# Patient Record
Sex: FEMALE | Race: BLACK OR AFRICAN AMERICAN | NOT HISPANIC OR LATINO | URBAN - METROPOLITAN AREA
[De-identification: names, ages, dates, MRNs, and addresses within clinical notes are randomized per-mention and may not be internally consistent; named-entity substitution may affect disease eponyms.]

---

## 2018-06-19 ENCOUNTER — HISTORICAL (OUTPATIENT)
Dept: ADMINISTRATIVE | Facility: HOSPITAL | Age: 58
End: 2018-06-19

## 2018-06-19 LAB
ALBUMIN SERPL-MCNC: 4.4 GM/DL (ref 3.4–5)
ALBUMIN/GLOB SERPL: 1.52 {RATIO} (ref 1.5–2.5)
ALP SERPL-CCNC: 110 UNIT/L (ref 38–126)
ALT SERPL-CCNC: 11 UNIT/L (ref 7–52)
AST SERPL-CCNC: 12 UNIT/L (ref 15–37)
BILIRUB SERPL-MCNC: 1.1 MG/DL (ref 0.2–1)
BILIRUBIN DIRECT+TOT PNL SERPL-MCNC: 0.3 MG/DL (ref 0–0.5)
BILIRUBIN DIRECT+TOT PNL SERPL-MCNC: 0.8 MG/DL
BUN SERPL-MCNC: 10 MG/DL (ref 7–18)
CALCIUM SERPL-MCNC: 9.3 MG/DL (ref 8.5–10)
CHLORIDE SERPL-SCNC: 105 MMOL/L (ref 98–107)
CHOLEST SERPL-MCNC: 167 MG/DL (ref 0–200)
CHOLEST/HDLC SERPL: 3 {RATIO}
CO2 SERPL-SCNC: 28 MMOL/L (ref 21–32)
CREAT SERPL-MCNC: 0.82 MG/DL (ref 0.6–1.3)
CREAT UR-MCNC: 200 MG/DL
EST. AVERAGE GLUCOSE BLD GHB EST-MCNC: 140 MG/DL
GLOBULIN SER-MCNC: 2.9 GM/DL (ref 1.2–3)
GLUCOSE SERPL-MCNC: 102 MG/DL (ref 74–106)
HBA1C MFR BLD: 6.5 % (ref 4.4–6.4)
HDLC SERPL-MCNC: 55 MG/DL (ref 35–60)
LDLC SERPL CALC-MCNC: 93 MG/DL (ref 0–129)
MICROALBUMIN UR-MCNC: 30 MG/L
MICROALBUMIN/CREAT RATIO PNL UR: <30 MG/GM
POTASSIUM SERPL-SCNC: 4 MMOL/L (ref 3.5–5.1)
PROT SERPL-MCNC: 7.3 GM/DL (ref 6.4–8.2)
SODIUM SERPL-SCNC: 142 MMOL/L (ref 136–145)
TRIGL SERPL-MCNC: 51 MG/DL (ref 30–150)
VLDLC SERPL CALC-MCNC: 10.2 MG/DL

## 2019-03-12 ENCOUNTER — HISTORICAL (OUTPATIENT)
Dept: ADMINISTRATIVE | Facility: HOSPITAL | Age: 59
End: 2019-03-12

## 2019-03-12 LAB
ABS NEUT (OLG): 3.1 X10(3)/MCL (ref 2.1–9.2)
ALBUMIN SERPL-MCNC: 4.4 GM/DL (ref 3.4–5)
ALBUMIN/GLOB SERPL: 1.38 {RATIO} (ref 1.5–2.5)
ALP SERPL-CCNC: 117 UNIT/L (ref 38–126)
ALT SERPL-CCNC: 12 UNIT/L (ref 7–52)
AST SERPL-CCNC: 15 UNIT/L (ref 15–37)
BILIRUB SERPL-MCNC: 1.5 MG/DL (ref 0.2–1)
BILIRUBIN DIRECT+TOT PNL SERPL-MCNC: 0.3 MG/DL (ref 0–0.5)
BILIRUBIN DIRECT+TOT PNL SERPL-MCNC: 1.2 MG/DL
BUN SERPL-MCNC: 13 MG/DL (ref 7–18)
CALCIUM SERPL-MCNC: 9.5 MG/DL (ref 8.5–10)
CHLORIDE SERPL-SCNC: 101 MMOL/L (ref 98–107)
CHOLEST SERPL-MCNC: 159 MG/DL (ref 0–200)
CHOLEST/HDLC SERPL: 3.7 {RATIO}
CO2 SERPL-SCNC: 28 MMOL/L (ref 21–32)
CREAT SERPL-MCNC: 0.8 MG/DL (ref 0.6–1.3)
CREAT UR-MCNC: 50 MG/DL
ERYTHROCYTE [DISTWIDTH] IN BLOOD BY AUTOMATED COUNT: 14.2 % (ref 11.5–17)
EST. AVERAGE GLUCOSE BLD GHB EST-MCNC: 148 MG/DL
GLOBULIN SER-MCNC: 3.2 GM/DL (ref 1.2–3)
GLUCOSE SERPL-MCNC: 126 MG/DL (ref 74–106)
HBA1C MFR BLD: 6.8 % (ref 4.4–6.4)
HCT VFR BLD AUTO: 39.4 % (ref 37–47)
HDLC SERPL-MCNC: 43 MG/DL (ref 35–60)
HGB BLD-MCNC: 13.3 GM/DL (ref 12–16)
LDLC SERPL CALC-MCNC: 80 MG/DL (ref 0–129)
LYMPHOCYTES # BLD AUTO: 2.3 X10(3)/MCL (ref 0.6–3.4)
LYMPHOCYTES NFR BLD AUTO: 40.8 % (ref 13–40)
MCH RBC QN AUTO: 29 PG (ref 27–31.2)
MCHC RBC AUTO-ENTMCNC: 34 GM/DL (ref 32–36)
MCV RBC AUTO: 86 FL (ref 80–94)
MICROALBUMIN UR-MCNC: 10 MG/L
MICROALBUMIN/CREAT RATIO PNL UR: <30 MG/GM
MONOCYTES # BLD AUTO: 0.3 X10(3)/MCL (ref 0.1–1.3)
MONOCYTES NFR BLD AUTO: 6.1 % (ref 0.1–24)
NEUTROPHILS NFR BLD AUTO: 53.1 % (ref 47–80)
PLATELET # BLD AUTO: 237 X10(3)/MCL (ref 130–400)
PMV BLD AUTO: 9.6 FL (ref 9.4–12.4)
POTASSIUM SERPL-SCNC: 4.1 MMOL/L (ref 3.5–5.1)
PROT SERPL-MCNC: 7.6 GM/DL (ref 6.4–8.2)
RBC # BLD AUTO: 4.58 X10(6)/MCL (ref 4.2–5.4)
SODIUM SERPL-SCNC: 138 MMOL/L (ref 136–145)
TRIGL SERPL-MCNC: 65 MG/DL (ref 30–150)
TSH SERPL-ACNC: 1.78 MIU/ML (ref 0.35–4.94)
VLDLC SERPL CALC-MCNC: 13 MG/DL
WBC # SPEC AUTO: 5.7 X10(3)/MCL (ref 4.5–11.5)

## 2020-01-08 ENCOUNTER — HISTORICAL (OUTPATIENT)
Dept: ADMINISTRATIVE | Facility: HOSPITAL | Age: 60
End: 2020-01-08

## 2020-01-08 LAB
ALBUMIN SERPL-MCNC: 4.4 GM/DL (ref 3.4–5)
ALBUMIN/GLOB SERPL: 1.47 {RATIO} (ref 1.5–2.5)
ALP SERPL-CCNC: 111 UNIT/L (ref 38–126)
ALT SERPL-CCNC: 12 UNIT/L (ref 7–52)
AST SERPL-CCNC: 13 UNIT/L (ref 15–37)
BILIRUB SERPL-MCNC: 1.3 MG/DL (ref 0.2–1)
BILIRUBIN DIRECT+TOT PNL SERPL-MCNC: 0.2 MG/DL (ref 0–0.5)
BILIRUBIN DIRECT+TOT PNL SERPL-MCNC: 1.1 MG/DL
BUN SERPL-MCNC: 8 MG/DL (ref 7–18)
CALCIUM SERPL-MCNC: 9.4 MG/DL (ref 8.5–10)
CHLORIDE SERPL-SCNC: 104 MMOL/L (ref 98–107)
CHOLEST SERPL-MCNC: 217 MG/DL (ref 0–200)
CHOLEST/HDLC SERPL: 3.9 {RATIO}
CO2 SERPL-SCNC: 27 MMOL/L (ref 21–32)
CREAT SERPL-MCNC: 0.73 MG/DL (ref 0.6–1.3)
CREAT UR-MCNC: 50 MG/DL
EST. AVERAGE GLUCOSE BLD GHB EST-MCNC: 148 MG/DL
GLOBULIN SER-MCNC: 3 GM/DL (ref 1.2–3)
GLUCOSE SERPL-MCNC: 105 MG/DL (ref 74–106)
HBA1C MFR BLD: 6.8 % (ref 4.4–6.4)
HDLC SERPL-MCNC: 56 MG/DL (ref 35–60)
LDLC SERPL CALC-MCNC: 149 MG/DL (ref 0–129)
MICROALBUMIN UR-MCNC: 10 MG/L
MICROALBUMIN/CREAT RATIO PNL UR: <30 MG/GM
POTASSIUM SERPL-SCNC: 3.8 MMOL/L (ref 3.5–5.1)
PROT SERPL-MCNC: 7.4 GM/DL (ref 6.4–8.2)
SODIUM SERPL-SCNC: 140 MMOL/L (ref 136–145)
TRIGL SERPL-MCNC: 61 MG/DL (ref 30–150)
VLDLC SERPL CALC-MCNC: 12.2 MG/DL

## 2020-10-14 ENCOUNTER — HISTORICAL (OUTPATIENT)
Dept: ADMINISTRATIVE | Facility: HOSPITAL | Age: 60
End: 2020-10-14

## 2020-10-14 LAB
ABS NEUT (OLG): 5.6 X10(3)/MCL (ref 2.1–9.2)
ALBUMIN SERPL-MCNC: 4.5 GM/DL (ref 3.4–5)
ALBUMIN/GLOB SERPL: 1.61 {RATIO} (ref 1.5–2.5)
ALP SERPL-CCNC: 117 UNIT/L (ref 38–126)
ALT SERPL-CCNC: 9 UNIT/L (ref 7–52)
APPEARANCE, UA: CLEAR
AST SERPL-CCNC: 10 UNIT/L (ref 15–37)
BACTERIA #/AREA URNS AUTO: NORMAL /HPF
BILIRUB SERPL-MCNC: 1 MG/DL (ref 0.2–1)
BILIRUB UR QL STRIP: NEGATIVE MG/DL
BILIRUBIN DIRECT+TOT PNL SERPL-MCNC: 0.2 MG/DL (ref 0–0.5)
BILIRUBIN DIRECT+TOT PNL SERPL-MCNC: 0.8 MG/DL
BUN SERPL-MCNC: 10 MG/DL (ref 7–18)
CALCIUM SERPL-MCNC: 9.6 MG/DL (ref 8.5–10)
CHLORIDE SERPL-SCNC: 100 MMOL/L (ref 98–107)
CHOLEST SERPL-MCNC: 178 MG/DL (ref 0–200)
CHOLEST/HDLC SERPL: 3 {RATIO}
CO2 SERPL-SCNC: 27 MMOL/L (ref 21–32)
COLOR UR: YELLOW
CREAT SERPL-MCNC: 0.78 MG/DL (ref 0.6–1.3)
CREAT UR-MCNC: 50 MG/DL
ERYTHROCYTE [DISTWIDTH] IN BLOOD BY AUTOMATED COUNT: 15.4 % (ref 11.5–17)
EST. AVERAGE GLUCOSE BLD GHB EST-MCNC: 171 MG/DL
GLOBULIN SER-MCNC: 2.8 GM/DL (ref 1.2–3)
GLUCOSE (UA): NEGATIVE MG/DL
GLUCOSE SERPL-MCNC: 119 MG/DL (ref 74–106)
HBA1C MFR BLD: 7.6 % (ref 4.4–6.4)
HCT VFR BLD AUTO: 39.9 % (ref 37–47)
HDLC SERPL-MCNC: 59 MG/DL (ref 35–60)
HGB BLD-MCNC: 13.2 GM/DL (ref 12–16)
HGB UR QL STRIP: NEGATIVE UNIT/L
KETONES UR QL STRIP: NEGATIVE MG/DL
LDLC SERPL CALC-MCNC: 104 MG/DL (ref 0–129)
LEUKOCYTE ESTERASE UR QL STRIP: NEGATIVE UNIT/L
LYMPHOCYTES # BLD AUTO: 3.1 X10(3)/MCL (ref 0.6–3.4)
LYMPHOCYTES NFR BLD AUTO: 33.9 % (ref 13–40)
MCH RBC QN AUTO: 28.4 PG (ref 27–31.2)
MCHC RBC AUTO-ENTMCNC: 33 GM/DL (ref 32–36)
MCV RBC AUTO: 86 FL (ref 80–94)
MICROALBUMIN UR-MCNC: 10 MG/L
MICROALBUMIN/CREAT RATIO PNL UR: <30 MG/GM
MONOCYTES # BLD AUTO: 0.4 X10(3)/MCL (ref 0.1–1.3)
MONOCYTES NFR BLD AUTO: 4.7 % (ref 0.1–24)
NEUTROPHILS NFR BLD AUTO: 61.4 % (ref 47–80)
NITRITE UR QL STRIP.AUTO: NEGATIVE
PH UR STRIP: 6 [PH]
PLATELET # BLD AUTO: 291 X10(3)/MCL (ref 130–400)
PMV BLD AUTO: 9.3 FL (ref 9.4–12.4)
POTASSIUM SERPL-SCNC: 3.7 MMOL/L (ref 3.5–5.1)
PROT SERPL-MCNC: 7.3 GM/DL (ref 6.4–8.2)
PROT UR QL STRIP: NEGATIVE MG/DL
RBC # BLD AUTO: 4.64 X10(6)/MCL (ref 4.2–5.4)
RBC #/AREA URNS HPF: NORMAL /HPF
SODIUM SERPL-SCNC: 140 MMOL/L (ref 136–145)
SP GR UR STRIP: 1.02
SQUAMOUS EPITHELIAL, UA: NORMAL /LPF
TRIGL SERPL-MCNC: 76 MG/DL (ref 30–150)
UROBILINOGEN UR STRIP-ACNC: 0.2 MG/DL
VLDLC SERPL CALC-MCNC: 15.2 MG/DL
WBC # SPEC AUTO: 9.1 X10(3)/MCL (ref 4.5–11.5)
WBC #/AREA URNS AUTO: NORMAL /[HPF]

## 2021-01-12 ENCOUNTER — HISTORICAL (OUTPATIENT)
Dept: ADMINISTRATIVE | Facility: HOSPITAL | Age: 61
End: 2021-01-12

## 2021-01-12 LAB
APPEARANCE, UA: NORMAL
BACTERIA #/AREA URNS AUTO: NORMAL /HPF
BILIRUB UR QL STRIP: NEGATIVE MG/DL
COLOR UR: YELLOW
GLUCOSE (UA): NEGATIVE MG/DL
HGB UR QL STRIP: NEGATIVE UNIT/L
KETONES UR QL STRIP: NEGATIVE MG/DL
LEUKOCYTE ESTERASE UR QL STRIP: NEGATIVE UNIT/L
NITRITE UR QL STRIP.AUTO: NEGATIVE
PH UR STRIP: 6 [PH]
PROT UR QL STRIP: NEGATIVE MG/DL
RBC #/AREA URNS HPF: NORMAL /HPF
SP GR UR STRIP: 1.01
SQUAMOUS EPITHELIAL, UA: NORMAL /LPF
UROBILINOGEN UR STRIP-ACNC: 0.2 MG/DL
WBC #/AREA URNS AUTO: NORMAL /[HPF]

## 2022-04-10 ENCOUNTER — HISTORICAL (OUTPATIENT)
Dept: ADMINISTRATIVE | Facility: HOSPITAL | Age: 62
End: 2022-04-10

## 2022-04-28 VITALS
BODY MASS INDEX: 40.08 KG/M2 | OXYGEN SATURATION: 100 % | WEIGHT: 217.81 LBS | DIASTOLIC BLOOD PRESSURE: 83 MMHG | HEIGHT: 62 IN | SYSTOLIC BLOOD PRESSURE: 153 MMHG

## 2022-05-03 NOTE — HISTORICAL [facility] CERNER
This is a historical note converted from Flower. Formatting and pictures may have been removed.  Please reference Flower for original formatting and attached multimedia. Chief Complaint  FMLA FORM/ BACK PAIN  History of Present Illness  Patient c/o right sided LBP that started on Friday, 1-8-21.? No known trauma. No known hx of Lumbar DDD.? Denies urinary complaints.  Works for [employer], food stamp determination.  Review of Systems  See HPI. ?Denies cardiac or respiratory complaints.? Denies?GI or  complaints.? Denies fever.  Physical Exam  Vitals & Measurements  HR:?111(Peripheral)? BP:?160/90?  HT:?157?cm? WT:?96.7?kg? BMI:?39.23?  General :?????Well-developed and? nourished, moderately anxious,?alert and oriented ?4. ?Stable on room air.  Neck :?????Supple, no lymphadenopathy, no thyromegaly, no bruits, no jugular venous distention  Cardiovascular :?????? Regular rhythm and rate, no murmurs, radial and dorsal pedal pulses 2+ bilaterally  Respiratory :??????Lungs clear to auscultation bilaterally, no wheezes, no crackles, no rhonchi.? Good air movement  Abdomen :?????? ?NABS, soft, nontender, no hepatosplenomegaly, no masses, no guarding or rebound????????????????????????  MS :???????? joints WNL, FROM, negative SLR, no?CCE  Neuro :? ?Grossly intact  Heme :?????? No bruising or petechiae?  Back:??????? no CVAT, lumbar muscle spasm with tenderness that is exacerbated range of motion.? No erythema or?vesicles.  Assessment/Plan  1.?Low back pain?M54.5  ?Will Rx Flexeril that she can use HS. Advised to start walking for strengthening.? Given Rx for PT.  2.?Physical deconditioning?R53.81  ?We will reevaluate her next week,?filled out?FMLA form  3.?Fatigue?R53.83  ?Will re-evaluate next Tuesday.  4.?COVID-19?U07.1  ?Quarantine completed on 1-6-21  5.?Anxiety?F41.9  Prescribe sertraline 25 mg  Referrals  Clinic Follow up, *Est. 01/19/21 15:30:00 CST, Order for future visit, Low back pain, [facility]   Problem  List/Past Medical History  Ongoing  Acute asthma  Bronchitis, acute  Diabetes  Hypercholesteremia  Hypertension  No-show for appointment  Obesity  Wellness examination  Historical  No qualifying data  Procedure/Surgical History  Drainage of Neck Skin, External Approach (02/01/2020)  Incision and drainage of abscess (eg, carbuncle, suppurative hidradenitis, cutaneous or subcutaneous abscess, cyst, furuncle, or paronychia); simple or single (02/01/2020)  Boil Removed from eyelid of Left Eye (2013)   Medications  amlodipine 5 mg oral tablet, See Instructions, 1 refills  atorvastatin 20 mg oral tablet, See Instructions, 1 refills  atorvastatin 40 mg oral tablet, 40 mg= 1 tab(s), Oral, Daily  Diprolene AF 0.05% topical cream, 1 lois, TOP, Daily, 1 refills  Flexeril 5 mg oral tablet, See Instructions  hydrochlorothiazide 25 mg oral tablet, See Instructions, 1 refills  losartan 50 mg oral tablet, See Instructions, 1 refills  Medrol 4 mg oral tablet, 1 packet(s), Oral, As Directed  meloxicam 15 mg oral tablet, 15 mg= 1 tab(s), Oral, Daily  MetFORMIN (Eqv-Glucophage XR) 500 mg oral tablet, extended release, See Instructions, 1 refills  Physical Therapy, See Instructions  sertraline 25 mg oral tablet, 25 mg= 1 tab(s), Oral, Daily, 3 refills  Allergies  E-Mycin?(Stomach discomfort)  erythromycin  Social History  Abuse/Neglect  No, 01/12/2021  No, 10/14/2020  No, No, Yes, 02/01/2020  Tobacco  Never (less than 100 in lifetime), No, 01/12/2021  Never (less than 100 in lifetime), No, 10/14/2020  Never (less than 100 in lifetime), N/A, Household tobacco concerns: No. Smokeless Tobacco Use: Never., 02/01/2020  Never (less than 100 in lifetime), No, 03/12/2019  Never smoker, 01/05/2016  Family History  Diabetes mellitus type 2: Mother and Father.  Hypertension.: Mother and Father.  Peripheral vascular disease.: Mother.  Stroke: Father.  Health Maintenance  Health Maintenance  ???Pending?(in the next year)  ??? ??OverDue  ??? ? ?  ?Diabetes Maintenance-Foot Exam due??03/21/20??and every 1??year(s)  ??? ? ? ?Influenza Vaccine due??10/01/20??and every 1??day(s)  ??? ??Due?  ??? ? ? ?Alcohol Misuse Screening due??01/02/21??and every 1??year(s)  ??? ? ? ?ADL Screening due??01/17/21??and every 1??year(s)  ??? ? ? ?Aspirin Therapy for CVD Prevention due??01/17/21??and every 1??year(s)  ??? ? ? ?Asthma Management-Asthma Education due??01/17/21??and every 6??month(s)  ??? ? ? ?Asthma Management-Spirometry due??01/17/21??Variable frequency  ??? ? ? ?Asthma Management-Alejandre Peak Flow due??01/17/21??Variable frequency  ??? ? ? ?Asthma Management-Written Action Plan due??01/17/21??and every 6??month(s)  ??? ? ? ?Breast Cancer Screening due??01/17/21??Unknown Frequency  ??? ? ? ?Cervical Cancer Screening due??01/17/21??Unknown Frequency  ??? ? ? ?Colorectal Screening due??01/17/21??Unknown Frequency  ??? ? ? ?Depression Screening due??01/17/21??Unknown Frequency  ??? ? ? ?Diabetes Maintenance-Eye Exam due??01/17/21??Unknown Frequency  ??? ? ? ?Hypertension Management-Education due??01/17/21??and every 1??year(s)  ??? ? ? ?Tetanus Vaccine due??01/17/21??and every 10??year(s)  ??? ? ? ?Zoster Vaccine due??01/17/21??Unknown Frequency  ??? ??Due In Future?  ??? ? ? ?Diabetes Maintenance-HgbA1c not due until??10/14/21??and every 1??year(s)  ??? ? ? ?Hypertension Management-BMP not due until??10/14/21??and every 1??year(s)  ??? ? ? ?Obesity Screening not due until??01/01/22??and every 1??year(s)  ??? ? ? ?Blood Pressure Screening not due until??01/12/22??and every 1??year(s)  ??? ? ? ?Body Mass Index Check not due until??01/12/22??and every 1??year(s)  ??? ? ? ?Hypertension Management-Blood Pressure not due until??01/12/22??and every 1??year(s)  ???Satisfied?(in the past 1 year)  ??? ??Satisfied?  ??? ? ? ?Blood Pressure Screening on??01/12/21.??Satisfied by Elisabet Mark CMA  ??? ? ? ?Body Mass Index Check on??01/12/21.??Satisfied by Deedee BOURNE  Elisabet S.  ??? ? ? ?Diabetes Maintenance-Fasting Lipid Profile on??10/14/20.??Satisfied by Gosia García  ??? ? ? ?Diabetes Maintenance-HgbA1c on??10/14/20.??Satisfied by Bony Pablo  ??? ? ? ?Diabetes Maintenance-Microalbumin on??10/14/20.??Satisfied by Bony Pablo  ??? ? ? ?Diabetes Screening on??10/14/20.??Satisfied by Gosia García  ??? ? ? ?Hypertension Management-Blood Pressure on??01/12/21.??Satisfied by Elisabet Mark CMA S.  ??? ? ? ?Hypertension Management-BMP on??10/14/20.??Satisfied by Gosia García  ??? ? ? ?Influenza Vaccine on??01/12/21.??Satisfied by Elisabet Mark CMA S.  ??? ? ? ?Lipid Screening on??10/14/20.??Satisfied by Gosia García  ??? ? ? ?Obesity Screening on??01/12/21.??Satisfied by Elisabet Mark CMA S.  ?  Lab Results  Test Name Test Result Date/Time   UA Nitri Negative 01/12/2021 15:27 CST   UA Leuk Est Negative UA 01/12/2021 15:27 CST   UA Squam Epithelial Rare 01/12/2021 15:27 CST

## 2022-05-03 NOTE — HISTORICAL [facility] CERN[facility]
This is a historical note converted from Flower. Formatting and pictures may have been removed.  Please reference Flower for original formatting and attached multimedia. Chief Complaint  ROUTINE FV, NEEDS BP REFILL  History of Present Illness  Patient here for six-month follow-up.? Denies any side effects to current medications.? Tolerating current meds and?feels that they are effective.? Denies change in social or family history.? No new complaints.  Working at [employer] office. No exercise. No nicotine.  Review of Systems  General:??????????????? Patient reports energy level is good. Denies weight change. ?Denies fever,chills, night sweats, fatigue or weakness.  HEENT:?????????????????? Denies vision changes or eye pain.? No sore throat, ear pain, sinus pressure or discharge.  Cardiovascular:??? Denies chest pain, palpitations, dyspnea on exertion, orthopnea.  Respiratory:???????? No cough, wheezing, shortness of breath, or sputum.  GI:????????????????????????? Denies nausea, emesis, constipation, diarrhea, melena, hematochezia or abdominal pain  :??????????????????????? No frequency, urgency, hematuria, discharge, or incontinence  MS:?????????????????????? Denies myalgias, arthralgias, joint effusion, edema, or weakness  Neuro:????????????????? No headaches, numbness in extremities, tingling, dizziness, or weakness  Psych:?????????????????? Denies anxiety, depression, suicidal or homicidal ideations, or irritability  Endo:??????????????????? Denies polyuria, polydipsia, polyphagia  Heme:????????????????? No abnormal bleeding or bruising. No lymph node enlargement or pain.  Physical Exam  Vitals & Measurements  HR:?72(Peripheral)? BP:?132/88?  HT:?165?cm? HT:?165?cm? WT:?97.9?kg? WT:?97.9?kg?  General :?????Well-developed and? nourished, no apparent distress, alert and oriented ?4  Integument :????? Skin is intact with no erythema.? No pustules or vesicles.? No rash or scale. No Lymphadenopathy.? No urticaria.?  No abnormal nevi  Neck :?????Supple, no lymphadenopathy, no thyromegaly, no bruits, no jugular venous distention  Cardiovascular :?????? Regular rhythm and rate, no murmurs, radial and dorsal pedal pulses 2+ bilaterally  Respiratory :??????Lungs clear to auscultation bilaterally, no wheezes, no crackles, no rhonchi.? Good air movement  Abdomen :?????? ?NABS, soft, nontender, no hepatosplenomegaly, no masses, no guarding or rebound????????????????????????  MS :??????? No muscle tenderness, joints WNL, FROM, negative SLR, no?CCE  Neuro :? ?????? CN II-XII intact, reflexes 2+ throughout, no motor sensory deficits, negative?cerebellar? tests  Heme :?????? No bruising or petechiae?  Back:??????? no CVAT  Assessment/Plan  1.?Diabetes  ?Diet discussed in detail. ?Refilled metformin?for 6 months. ?We will check A1c and microalbumin today. ?Follow-up to be determined. ?Otherwise we will see her in 6 months for CPX  Ordered:  Clinic Follow up, *Est. 12/19/18 3:00:00 CST, CPX in 6 months, Order for future visit, Diabetes  Hypertension  Hypercholesteremia, [facility]  Hemoglobin A1c, Routine collect, 06/19/18 14:41:00 CDT, Blood, Order for future visit, Stop date 06/19/18 14:41:00 CDT, Lab Collect, Diabetes, 06/19/18 14:41:00 CDT  Lab Collection Request, 06/19/18 14:41:00 CDT, [facility] - AFP, 06/19/18 14:41:00 CDT  Microalbumin Urine, Routine collect, Urine, Order for future visit, 06/19/18 14:41:00 CDT, Stop date 06/19/18 14:41:00 CDT, Nurse collect, Diabetes  Office/Outpatient Visit Level 4 Established [id number], Diabetes  Hypertension  Hypercholesteremia, [facility] - [facility], 06/19/18 14:41:00 CDT  ?  2.?Hypertension  ?Stable. ?Refill medication for 6 months  Ordered:  Clinic Follow up, *Est. 12/19/18 3:00:00 CST, CPX in 6 months, Order for future visit, Diabetes  Hypertension  Hypercholesteremia, [facility]  Lab Collection Request, 06/19/18 14:41:00 CDT, [facility], 06/19/18 14:41:00 CDT  Office/Outpatient Visit Level  4 Established [id number] PC, Diabetes  Hypertension  Hypercholesteremia, [facility] - [facility], 06/19/18 14:41:00 CDT  ?  3.?Hypercholesteremia  ?Refill medication for 6 months. ?Will check CMP and lipids today.  Ordered:  Clinic Follow up, *Est. 12/19/18 3:00:00 CST, CPX in 6 months, Order for future visit, Diabetes  Hypertension  Hypercholesteremia, [facility]  Comprehensive Metabolic Panel, Routine collect, 06/19/18 14:41:00 CDT, Blood, Order for future visit, Stop date 06/19/18 14:41:00 CDT, Lab Collect, Hypercholesteremia, 06/19/18 14:41:00 CDT  Lab Collection Request, 06/19/18 14:41:00 CDT, [facility] - [facility], 06/19/18 14:41:00 CDT  Lipid Panel, Routine collect, 06/19/18 14:41:00 CDT, Blood, Order for future visit, Stop date 06/19/18 14:41:00 CDT, Lab Collect, Hypercholesteremia, 06/19/18 14:41:00 CDT  Office/Outpatient Visit Level 4 Established [id number] PC, Diabetes  Hypertension  Hypercholesteremia, [facility] - [facility], 06/19/18 14:41:00 CDT  ?  Orders:  amLODIPine, See Instructions, TAKE 1 TABLET BY MOUTH EVERY DAY, # 30 tab(s), 5 Refill(s), Pharmacy: [facility] [id number]  atorvastatin, See Instructions, TAKE 1 TABLET BY MOUTH EVERY DAY, # 30 tab(s), 5 Refill(s), Pharmacy: [id number]  hydrochlorothiazide, See Instructions, TAKE 1 TABLET BY MOUTH EVERY DAY, # 30 tab(s), 5 Refill(s), Pharmacy: [facility] [id number]  losartan, See Instructions, TAKE 1 TABLET BY MOUTH EVERY DAY, # 30 tab(s), 5 Refill(s), [id number]  metFORMIN, See Instructions, TAKE 1 TABLET BY MOUTH EVERY DAY, # 30 tab(s), 5 Refill(s), Pharmacy: [facility] [id number]   Problem List/Past Medical History  Ongoing  Acute asthma  Bronchitis, acute  Diabetes  Hypercholesteremia  Hypertension  Historical  No qualifying data  Procedure/Surgical History  Boil Removed from eyelid of Left Eye (2013).  Medications  amLODIPine 5 mg oral tablet, See Instructions, 5 refills  atorvastatin 20 mg oral tablet, See  Instructions, 5 refills  hydrochlorothiazide 25 mg oral tablet, See Instructions, 5 refills  losartan 50 mg oral tablet, See Instructions, 5 refills  metFORMIN 500 mg oral tablet, extended release, See Instructions, 5 refills  Allergies  E-Mycin?(Stomach discomfort)  erythromycin  Social History  Tobacco  Never smoker, 03/26/2018  Family History  Family history is negative  Health Maintenance  Health Maintenance  ???Pending?(in the next year)  ??? ??OverDue  ??? ? ? ?Body Mass Index Check due??01/05/17??and every 1??year(s)  ??? ? ? ?Obesity Screening due??01/05/17??and every 1??year(s)  ??? ??Due?  ??? ? ? ?Alcohol Misuse Screening due??06/19/18??and every 1??year(s)  ??? ? ? ?Aspirin Therapy for CVD Prevention due??06/19/18??and every 1??year(s)  ??? ? ? ?Asthma Management-Asthma Medication Prescribed due??06/19/18??Variable frequency  ??? ? ? ?Asthma Management-Asthma Education due??06/19/18??and every 6??month(s)  ??? ? ? ?Asthma Management-Spirometry due??06/19/18??Variable frequency  ??? ? ? ?Asthma Management-Alejandre Peak Flow due??06/19/18??Variable frequency  ??? ? ? ?Asthma Management-Written Action Plan due??06/19/18??and every 6??month(s)  ??? ? ? ?Breast Cancer Screening due??06/19/18??Variable frequency  ??? ? ? ?Cervical Cancer Screening due??06/19/18??and every 5??year(s)  ??? ? ? ?Colorectal Screening due??06/19/18??Variable frequency  ??? ? ? ?Depression Screening due??06/19/18??and every 1??year(s)  ??? ? ? ?Hypertension Management-Education due??06/19/18??and every 1??year(s)  ??? ? ? ?Hypertension Management-BMP due??06/19/18??Variable frequency  ??? ? ? ?Smoking Cessation due??06/19/18??and every 180??day(s)  ??? ? ? ?Tetanus Vaccine due??06/19/18??and every 10??year(s)  ??? ? ? ?Zoster Vaccine due??06/19/18??and every 100??year(s)  ??? ??Due In Future?  ??? ? ? ?Influenza Vaccine not due until??10/02/18??and every 1??year(s)  ??? ? ? ?Hypertension Management-Blood Pressure not due  until??12/19/18??and every 6??month(s)  ???Satisfied?(in the past 1 year)  ??? ??Satisfied?  ??? ? ? ?Blood Pressure Screening on??06/19/18.??Satisfied by Nicolasa Bolton  ??? ? ? ?Diabetes Screening on??06/19/18.??Satisfied by AMRIT Milan Jr., MD  ??? ? ? ?Hypertension Management-Blood Pressure on??06/19/18.??Satisfied by Nicolasa Bolton  ??? ? ? ?Lipid Screening on??05/18/18.??Satisfied by AMRIT Milan Jr., MD  ??? ? ? ?Tobacco Use Screening on??06/19/18.??Satisfied by Nicolasa Bolton  ?  ?

## 2022-05-03 NOTE — HISTORICAL [facility]
This is a historical note converted from Flower. Formatting and pictures may have been removed.  Please reference Flower for original formatting and attached multimedia. Chief Complaint  CPX  History of Present Illness  Patient presents for annual wellness exam.? Denies any change in social history or family history.? Reports to be tolerating prescribed medicines well. Denies any side effects. ?No new problems.? 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 No exercise, no nicotine or etoh.  Father 83 : DM, HTN, CVA  Mother 82 :? DM, HTN , PAD  No sibs  No lifetime colonoscopy or MMG,?declines both. ?Understands risks of not diagnosing cancer.  Gets yearly eye exams.  Review of Systems  General:??????????????? Patient reports energy level is good. Denies weight change. ?Denies fever,chills, night sweats, fatigue or weakness.  Integument:???????? Rash left lower leg  HEENT:?????????????????? Denies vision changes or eye pain.? No sore throat, ear pain, sinus pressure or discharge.  Cardiovascular:??? Denies chest pain, palpitations, dyspnea on exertion, orthopnea.  Respiratory:???????? No cough, wheezing, shortness of breath, or sputum.  GI:????????????????????????? Denies nausea, emesis, constipation, diarrhea, melena, hematochezia or abdominal pain  :??????????????????????? No frequency, urgency, hematuria, discharge, or incontinence  MS:??????????????????????Low back pain. Denies edema, or weakness  Neuro:????????????????? No headaches, numbness in extremities, tingling, dizziness, or weakness  Psych:?????????????????? Denies anxiety, depression, suicidal or homicidal ideations, or irritability  Endo:??????????????????? Denies polyuria, polydipsia, polyphagia  Heme:????????????????? No abnormal bleeding or bruising. No lymph node enlargement or pain.  Physical Exam  Vitals & Measurements  HR:?76(Peripheral)? BP:?130/92?  HT:?165?cm? WT:?102.1?kg? BMI:?37.51?  General :?????Well-developed and? nourished, no apparent  distress, alert and oriented ?4  Integument :????? Eczematous changes?on extremities. ?No pustules or vesicles.? No Lymphadenopathy.? No urticaria.? No abnormal nevi  HEENT :?????ADRIANNE, EOMI ; TMs and EACs clear, normal turbinates with no erythema, normal mucosa, no sinus tenderness; no erythema or exudate of mouth or pharynx  Neck :?????Supple, no lymphadenopathy, no thyromegaly, no bruits, no jugular venous distention  Cardiovascular :?????? Regular rhythm and rate, no murmurs, radial and dorsal pedal pulses 2+ bilaterally  Respiratory :??????Lungs clear to auscultation bilaterally, no wheezes, no crackles, no rhonchi.? Good air movement  Abdomen :?????? ?NABS, soft, nontender, no hepatosplenomegaly, no masses, no guarding or rebound??????????????????????????  MS :??????? No muscle tenderness, joints WNL, FROM, negative SLR, no?CCE  Neuro :? ?????? CN II-XII intact, reflexes 2+ throughout, no motor sensory deficits, negative?cerebellar? tests  Psych :??????Normal affect, patient calm, good historian, patient answers questions??? appropriately.????Good hygiene? ??  Heme :?????? No bruising or petechiae?  ?   Diabetic foot exam:?No areas of erythema?or ulcer.? No callus.? Sensation intact bilateral feet to monofilament testing.? 2+ DP pulses bilaterally, good capillary refill.  Assessment/Plan  1.?Wellness examination?Z00.00  ?Age-appropriate wellness topics discussed.? Strongly encouraged to lose weight and to increase her aerobic exercise.? We will check CBC, CMP, A1c, lipids, microalbumin, and TSH. ?Follow-up?to be determined.? Refilled chronic medications for 6 months  2.?Diabetes?E11.9  ?Encouraged low carbohydrate diet.  3.?Hypertension?I10  ?Borderline diastolic blood pressure. ?Patient will?monitor blood pressure at home and call us if consistently greater than 140/90.  4.?Hypercholesteremia?E78.00  5.?Eczema?L30.9  Prescribed Diprolene?0.05% cream?to use as needed   Problem List/Past Medical  History  Ongoing  Acute asthma  Bronchitis, acute  Diabetes  Hypercholesteremia  Hypertension  Historical  No qualifying data  Procedure/Surgical History  Boil Removed from eyelid of Left Eye (2013)   Medications  amlodipine 5 mg oral tablet, See Instructions, 1 refills  atorvastatin 20 mg oral tablet, See Instructions, 1 refills  Diprolene AF 0.05% topical cream, 1 lois, TOP, Daily, 1 refills  hydrochlorothiazide 25 mg oral tablet, See Instructions, 1 refills  losartan 50 mg oral tablet, See Instructions, 1 refills  metFORMIN 500 mg oral tablet, extended release, See Instructions, 1 refills  Allergies  E-Mycin?(Stomach discomfort)  erythromycin  Social History  Tobacco  Never (less than 100 in lifetime), No, 03/12/2019  Never smoker, 03/26/2018  Family History  Diabetes mellitus type 2: Mother and Father.  Hypertension.: Mother and Father.  Peripheral vascular disease.: Mother.  Stroke: Father.  Health Maintenance  Health Maintenance  ???Pending?(in the next year)  ??? ??Due?  ??? ? ? ?ADL Screening due??03/21/19??and every 1??year(s)  ??? ? ? ?Alcohol Misuse Screening due??03/21/19??and every 1??year(s)  ??? ? ? ?Aspirin Therapy for CVD Prevention due??03/21/19??and every 1??year(s)  ??? ? ? ?Asthma Management-Alejandre Peak Flow due??03/21/19??Variable frequency  ??? ? ? ?Asthma Management-Written Action Plan due??03/21/19??and every 6??month(s)  ??? ? ? ?Asthma Management-Spirometry due??03/21/19??Variable frequency  ??? ? ? ?Asthma Management-Asthma Education due??03/21/19??and every 6??month(s)  ??? ? ? ?Breast Cancer Screening due??03/21/19??and every?  ??? ? ? ?Cervical Cancer Screening due??03/21/19??and every?  ??? ? ? ?Colorectal Screening due??03/21/19??and every?  ??? ? ? ?Depression Screening due??03/21/19??and every?  ??? ? ? ?Diabetes Maintenance-Eye Exam due??03/21/19??and every?  ??? ? ? ?Diabetes Maintenance-Foot Exam due??03/21/19??and every?  ??? ? ? ?Hypertension Management-Education  due??03/21/19??and every 1??year(s)  ??? ? ? ?Smoking Cessation due??03/21/19??and every 180??day(s)  ??? ? ? ?Tetanus Vaccine due??03/21/19??and every 10??year(s)  ??? ? ? ?Zoster Vaccine due??03/21/19??and every 100??year(s)  ??? ??Due In Future?  ??? ? ? ?Blood Pressure Screening not due until??03/11/20??and every 1??year(s)  ??? ? ? ?Body Mass Index Check not due until??03/11/20??and every 1??year(s)  ??? ? ? ?Diabetes Maintenance-HgbA1c not due until??03/11/20??and every 1??year(s)  ??? ? ? ?Diabetes Maintenance-Fasting Lipid Profile not due until??03/11/20??and every 1??year(s)  ??? ? ? ?Hypertension Management-Blood Pressure not due until??03/11/20??and every 1??year(s)  ??? ? ? ?Hypertension Management-BMP not due until??03/11/20??and every 1??year(s)  ??? ? ? ?Obesity Screening not due until??03/12/20??and every 1??year(s)  ???Satisfied?(in the past 1 year)  ??? ??Satisfied?  ??? ? ? ?Blood Pressure Screening on??03/12/19.??Satisfied by Hanny Mack CMA  ??? ? ? ?Body Mass Index Check on??03/12/19.??Satisfied by Hanny Mack CMA  ??? ? ? ?Diabetes Maintenance-HgbA1c on??03/12/19.??Satisfied by Toña Luis  ??? ? ? ?Diabetes Screening on??03/12/19.??Satisfied by Toña Luis  ??? ? ? ?Hypertension Management-BMP on??03/12/19.??Satisfied by Gosia García  ??? ? ? ?Influenza Vaccine on??03/12/19.??Satisfied by Hanny Mack CMA  ??? ? ? ?Lipid Screening on??03/12/19.??Satisfied by Gosia García  ??? ? ? ?Obesity Screening on??03/12/19.??Satisfied by Hanny Mack CMA  ?  ?

## 2022-06-27 PROBLEM — I10 HYPERTENSION: Status: ACTIVE | Noted: 2022-06-27

## 2022-06-27 PROBLEM — J45.909 ASTHMA: Status: ACTIVE | Noted: 2022-06-27

## 2022-06-27 PROBLEM — E11.9 DIABETES MELLITUS: Status: ACTIVE | Noted: 2022-06-27

## 2022-06-27 PROBLEM — E66.9 OBESITY: Status: ACTIVE | Noted: 2022-06-27

## 2022-06-27 PROBLEM — E78.00 HYPERCHOLESTEROLEMIA: Status: ACTIVE | Noted: 2022-06-27

## 2022-07-17 PROBLEM — F33.9 DEPRESSION, RECURRENT: Status: ACTIVE | Noted: 2022-07-17

## 2022-10-25 PROBLEM — E55.9 VITAMIN D DEFICIENCY: Status: ACTIVE | Noted: 2022-10-25

## 2022-10-25 PROBLEM — K21.9 GERD (GASTROESOPHAGEAL REFLUX DISEASE): Status: ACTIVE | Noted: 2022-10-25

## 2023-06-28 PROBLEM — F41.9 ANXIETY: Status: ACTIVE | Noted: 2023-06-28

## 2024-06-26 ENCOUNTER — TELEPHONE (OUTPATIENT)
Dept: PHARMACY | Facility: CLINIC | Age: 64
End: 2024-06-26

## 2024-06-27 NOTE — TELEPHONE ENCOUNTER
Contacted the patient to perform Medication Therapy Management review, specifically in reference to refilling metformin er to improve PDC for HEDIS measurements.   Waiting for patient response to verify

## 2024-10-15 ENCOUNTER — TELEPHONE (OUTPATIENT)
Dept: PHARMACY | Facility: CLINIC | Age: 64
End: 2024-10-15

## 2024-10-15 NOTE — TELEPHONE ENCOUNTER
[facility]/Population Health Chart Review & Patient Outreach Details For Medication Adherence Project    Reason for Outreach Encounter: 3rd Party payor non-compliance report ([employer], [employer], C, etc)  2.  Patient Outreach Method: Reviewed Patient Chart  3.   Medication in question: atorvastatin    LAST FILLED: 8/9/24 for 90 day supply  Hyperlipidemia Medications               atorvastatin (LIPITOR) 40 MG tablet Take 1 tablet (40 mg total) by mouth once daily.               4.  Reviewed and or Updates Made To: Patient Chart  5. Outreach Outcomes and/or actions taken: Patient filled medication and is on track to be adherent

## 2024-10-24 ENCOUNTER — TELEPHONE (OUTPATIENT)
Dept: PHARMACY | Facility: CLINIC | Age: 64
End: 2024-10-24

## 2024-10-24 NOTE — TELEPHONE ENCOUNTER
[facility]/Population Health Chart Review & Patient Outreach Details For Medication Adherence Project    Reason for Outreach Encounter: 3rd Party payor non-compliance report ([employer], [employer], C, etc)  2.  Patient Outreach Method: Reviewed Patient Chart  3.   Medication in question: metformin   LAST FILLED: 8/9/24 for 90 day supply  Diabetes Medications               metFORMIN (GLUCOPHAGE-XR) 500 MG ER 24hr tablet Take 1 tablet (500 mg total) by mouth 2 (two) times daily.              4.  Reviewed and or Updates Made To: Patient Chart  5. Outreach Outcomes and/or actions taken: Patient filled medication and is on track to be adherent

## 2024-11-09 ENCOUNTER — TELEPHONE (OUTPATIENT)
Dept: PHARMACY | Facility: CLINIC | Age: 64
End: 2024-11-09

## 2024-11-09 NOTE — TELEPHONE ENCOUNTER
[facility]/Population Health Chart Review & Patient Outreach Details For Medication Adherence Project    Reason for Outreach Encounter: 3rd Party payor non-compliance report ([employer], [employer], C, etc)  2.  Patient Outreach Method: Reviewed patient chart   3.   Medication in question:   Hyperlipidemia Medications               atorvastatin (LIPITOR) 40 MG tablet Take 1 tablet (40 mg total) by mouth once daily.                  atorvastatin  last filled  11/8/24 for 90 day supply      4.  Reviewed and or Updates Made To: Patient Chart  5. Outreach Outcomes and/or actions taken: Patient filled medication and is on track to be adherent  Additional Notes:

## 2024-11-26 ENCOUNTER — TELEPHONE (OUTPATIENT)
Dept: PHARMACY | Facility: CLINIC | Age: 64
End: 2024-11-26

## 2024-11-26 NOTE — TELEPHONE ENCOUNTER
[facility]/Population Health Chart Review & Patient Outreach Details For Medication Adherence Project    Reason for Outreach Encounter: 3rd Party payor non-compliance report ([employer], [employer], [employer], etc)  2.  Patient Outreach Method: Reviewed patient chart   3.   Medication in question: Metformin       4.  Reviewed and or Updates Made To: Patient Chart  5. Outreach Outcomes and/or actions taken: Medication discontinued  Additional Notes:   Alternate therapy: Jardiance

## 2025-02-06 ENCOUNTER — TELEPHONE (OUTPATIENT)
Dept: PHARMACY | Facility: CLINIC | Age: 65
End: 2025-02-06

## 2025-02-06 NOTE — TELEPHONE ENCOUNTER
[facility]/Population Health Chart Review & Patient Outreach Details For Medication Adherence Project    Reason for Outreach Encounter: 3rd Party payor non-compliance report ([employer], [employer], [employer], etc)  2.  Patient Outreach Method: Reviewed patient chart  and [employer] message  3.   Medication in question:    Hypertension Medications               amLODIPine (NORVASC) 5 MG tablet TAKE 1 TABLET BY MOUTH EVERY DAY    hydroCHLOROthiazide (HYDRODIURIL) 25 MG tablet Take 1 tablet (25 mg total) by mouth once daily.    losartan (COZAAR) 50 MG tablet TAKE 1 TABLET(50 MG) BY MOUTH DAILY                 losartan  last filled  8/7/24 for 90 day supply      4.  Reviewed and or Updates Made To: Patient Chart  5. Outreach Outcomes and/or actions taken: Sent inquiry to patient: Waiting for response  Additional Notes:

## 2025-02-28 NOTE — TELEPHONE ENCOUNTER
[facility]/Population Health Chart Review & Patient Outreach Details For Medication Adherence Project    Reason for Outreach Encounter: Follow up to a previous patient outreach  2.  Patient Outreach Method: MyChart message  3.   Medication in question:    Hypertension Medications              amLODIPine (NORVASC) 5 MG tablet TAKE 1 TABLET BY MOUTH EVERY DAY    hydroCHLOROthiazide (HYDRODIURIL) 25 MG tablet Take 1 tablet (25 mg total) by mouth once daily.    losartan (COZAAR) 50 MG tablet Take 1 tablet (50 mg total) by mouth once daily.                 Losartan 50 mg tablets last filled for 90 day supply  4.  Reviewed and or Updates Made To: Patient Chart  5. Outreach Outcomes and/or actions taken: Patient filled medication and is on track to be adherent  Additional Notes:   Losartan 50 mg tablets was filled on 2/10/25 for a 90 ds. Patient is on track to being adherent.

## 2025-03-24 ENCOUNTER — TELEPHONE (OUTPATIENT)
Dept: PHARMACY | Facility: CLINIC | Age: 65
End: 2025-03-24

## 2025-04-23 ENCOUNTER — TELEPHONE (OUTPATIENT)
Dept: PHARMACY | Facility: CLINIC | Age: 65
End: 2025-04-23

## 2025-04-23 NOTE — TELEPHONE ENCOUNTER
[facility]/Population Health Chart Review & Patient Outreach Details For Medication Adherence Project    Reason for Outreach Encounter: 3rd Party payor non-compliance report ([employer], [employer], [employer], etc)  2.  Patient Outreach Method: Reviewed patient chart   3.   Medication in question:    Diabetes Medications              empagliflozin (JARDIANCE) 10 mg tablet Take 1 tablet (10 mg total) by mouth once daily.              4.  Reviewed and or Updates Made To: Patient Chart  5. Outreach Outcomes and/or actions taken: Other  Additional Notes:

## 2025-04-28 ENCOUNTER — TELEPHONE (OUTPATIENT)
Dept: PHARMACY | Facility: CLINIC | Age: 65
End: 2025-04-28

## 2025-04-28 NOTE — TELEPHONE ENCOUNTER
[facility]/Population Health Chart Review & Patient Outreach Details For Medication Adherence Project    Reason for Outreach Encounter: 3rd Party payor non-compliance report ([employer], [employer], [employer], etc)  2.  Patient Outreach Method: Reviewed patient chart   3.   Medication in question:    Hyperlipidemia Medications              atorvastatin (LIPITOR) 40 MG tablet Take 1 tablet (40 mg total) by mouth once daily.               LF 90 ds 2/10/25    4.  Reviewed and or Updates Made To: Patient Chart  5. Outreach Outcomes and/or actions taken: Patient filled medication and is on track to be adherent  Additional Notes:

## 2025-06-20 ENCOUNTER — TELEPHONE (OUTPATIENT)
Dept: PHARMACY | Facility: CLINIC | Age: 65
End: 2025-06-20

## 2025-06-21 NOTE — TELEPHONE ENCOUNTER
[facility]/Population Health Chart Review & Patient Outreach Details For Medication Adherence Project    Reason for Outreach Encounter: 3rd Party payor non-compliance report ([employer], [employer], [employer], etc)  2.  Patient Outreach Method: Reviewed patient chart   3.   Medication in question:    Hypertension Medications              amLODIPine (NORVASC) 5 MG tablet TAKE 1 TABLET BY MOUTH EVERY DAY    hydroCHLOROthiazide (HYDRODIURIL) 25 MG tablet Take 1 tablet (25 mg total) by mouth once daily.    losartan (COZAAR) 50 MG tablet Take 1 tablet (50 mg total) by mouth once daily.                 losartan   last filled  2/10/25 for 90 day supply    4.  Reviewed and or Updates Made To: Patient Chart  5. Outreach Outcomes and/or actions taken: Sent inquiry to patient: Waiting for response  Additional Notes:

## 2025-06-27 ENCOUNTER — TELEPHONE (OUTPATIENT)
Dept: PHARMACY | Facility: CLINIC | Age: 65
End: 2025-06-27

## 2025-06-27 NOTE — TELEPHONE ENCOUNTER
[facility]/Population Health Chart Review & Patient Outreach Details For Medication Adherence Project    Reason for Outreach Encounter: 3rd Party payor non-compliance report ([employer], [employer], [employer], etc)  2.  Patient Outreach Method: Reviewed patient chart   3.   Medication in question:    Hyperlipidemia Medications              atorvastatin (LIPITOR) 40 MG tablet Take 1 tablet (40 mg total) by mouth once daily.                  LF 30 ds 6/23/25    4.  Reviewed and or Updates Made To: Patient Chart  5. Outreach Outcomes and/or actions taken: Patient filled medication and is on track to be adherent  Additional Notes:

## 2025-07-20 ENCOUNTER — TELEPHONE (OUTPATIENT)
Dept: PHARMACY | Facility: CLINIC | Age: 65
End: 2025-07-20

## 2025-07-20 NOTE — TELEPHONE ENCOUNTER
[facility]/Population Health Chart Review & Patient Outreach Details For Medication Adherence Project    Reason for Outreach Encounter: 3rd Party payor non-compliance report ([employer], [employer], [employer], etc)  2.  Patient Outreach Method: Reviewed patient chart   3.   Medication in question:    Hypertension Medications              amLODIPine (NORVASC) 5 MG tablet TAKE 1 TABLET BY MOUTH EVERY DAY    hydroCHLOROthiazide (HYDRODIURIL) 25 MG tablet Take 1 tablet (25 mg total) by mouth once daily.    losartan (COZAAR) 50 MG tablet Take 1 tablet (50 mg total) by mouth once daily.                 Losartan  last filled  6/23/25 for 30 day supply      4.  Reviewed and or Updates Made To: Patient Chart  5. Outreach Outcomes and/or actions taken: Patient filled medication and is on track to be adherent  Additional Notes:

## 2025-07-21 ENCOUNTER — TELEPHONE (OUTPATIENT)
Dept: PHARMACY | Facility: CLINIC | Age: 65
End: 2025-07-21

## 2025-07-21 NOTE — TELEPHONE ENCOUNTER
[facility]/Population Health Chart Review & Patient Outreach Details For Medication Adherence Project    Reason for Outreach Encounter: 3rd Party payor non-compliance report ([employer], [employer], [employer], etc)  2.  Patient Outreach Method: Reviewed patient chart   3.   Medication in question:    Hyperlipidemia Medications              atorvastatin (LIPITOR) 40 MG tablet Take 1 tablet (40 mg total) by mouth once daily.                  atorvastatin  last filled  6/23 for 30 day supply      4.  Reviewed and or Updates Made To: Patient Chart  5. Outreach Outcomes and/or actions taken: Patient filled medication and is on track to be adherent  Additional Notes:

## 2025-08-11 ENCOUNTER — TELEPHONE (OUTPATIENT)
Dept: PHARMACY | Facility: CLINIC | Age: 65
End: 2025-08-11

## 2025-08-13 ENCOUNTER — TELEPHONE (OUTPATIENT)
Dept: PHARMACY | Facility: CLINIC | Age: 65
End: 2025-08-13